# Patient Record
(demographics unavailable — no encounter records)

---

## 2024-10-08 NOTE — PHYSICAL EXAM
[Obese] : obese [Normal] : no peripheral adenopathy appreciated [de-identified] : no acute distress [de-identified] : mild expiratory wheezing and ronchi [de-identified] : no tenderness

## 2024-10-08 NOTE — HISTORY OF PRESENT ILLNESS
[de-identified] :  62 y/o M with HTN, DLD, MIA and COPD was referred by Dr Elias for further evaluation and management of erythrocytosis.  His recent blood work from 10/11/19 showed Hb 20.2 with HCT 63.3. Today's Hb was 19.9 with Hct of 59.7. WBC was 7.88 and plts 162. Upon review of records, his Hb/Hct from Feb 2019 was 16.9/54.4. He was never told about any CBC abnormalities in the past.   He does c/o fatigue and skin flushing. Pt denies having any headaches, CP, SOB, palpitations, pruritus, abdominal pain, fever, chills, weight loss, night sweats or lumps.   He has been taking testosterone injections for the last 4 years. Per pt, his testosterone levels were low in the past. Recent testosterone level from Feb 2019 was 964. He has also been a heavy smoker for 40 years. He quit smoking 2 weeks back. Pt was also diagnosed with MIA in 2011, but has been non compliant with CPAP because of discomfort. He also has h/o COPD and pulmonary nodules on lung screening.     [de-identified] : 6/11/2024 Patient returns after a long hiatus recently diagnosed with metastatic poorly differentiated carcinoma involving liver , lung and bone . He presented with mild left rib pain . MRI of liver was more consistent with cholangiocarcinoma , Final pathology including IHC is still pending . He denies weight loss or significant change in cough or breathing . no headaches . He has COPD , MIA , intolerant to CPAP , he continues to smoke ( less than one pack daily )  Family History strongly positive for pancreatic and lung cancer .   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  7/26/2024 Patient returns for cycle 2 of chemoimmunotherapy . He developed moderately severe nausea , dry heaving for one week after first dose and lost some weight .  He felt much better after second dose likely after adding olanzapine . He feels better , increased energy . 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  His main complaint is diffuse back pain and neck stiffness responding to oxycodone . MRI brain shows a small cortical/meningeal enhancing lesion and a second dural based lesion highly suspicious for metastasis , no edema or mass effect . He denies headaches , difficulty with gait or speech .   8/23/2024 Patient returns after cycle 2 , he tolerated well except for modest nausea , He had moderate headaches for 2 days after spinal tap, CSF cytology was negative . cell count is unavailable ? He denies cough , SOB , abdominal pain or vomiting .   9/20/2024 Patient returns after 3 cycles of chemo-immunotherapy , last treatment was associated with prolonged nausea , headaches for which he was seen in ED , received hydration and magnesium . NO weight loss but continues to complain of fatigue and was unable to pursue his favorite activity ( fishing ) . Headaches have subsided.    10/8/2024 Patient returns fo  Cycle 4 day1 of chemoimmunotherapy . He is having bad days for the most part , nausea , fatigue , decreased intake . Blood work notable for slightly increased TSH . As per family , he is more depressed , has episodes of vomiting not always related to chemotherapy . As per patient , he feels best for the first 2 days following treatment ( steroid effect ? ) repeat MRI brain showed decreased meningeal enhancement , CT chest with reduction is adenopathy , Liver lesions are stable but new peritoneal implants are noted. He denies adbominal pain or increased girth ,.

## 2024-10-08 NOTE — ASSESSMENT
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 PDL1 40% TMB : 10  ON cisplatinum/Gemzar/durvalumab , delayed nausea, improved with olanzapine   MRI brain : suspicious for leptomeningeal / dural mets  Negative CSF cytology  Still with mild neck stiffness and low back pain relieved with oxycodone   S/P 3 cycles of cisplatinum/Gemzar/Durvalumab  Delayed nausea , fatigue , headaches , low mg CA19.9 normalized ?   S/P cycle 4 Day1   Nausea , episodes of vomiting , treatment or disease related ? MRI head : decreased meningeal enhancement' CT chest improved , CT abdomen new peritoneal implants  Mixed response ? Depression  elevated TSH , subclinical hypothyroidism   Plan : start on synthroid 25 micro           check cortisol           start on decadron 4 mg bid and taper as tolerated            prognosis discussed at length , patient will likely forgo any further treatment in the presence of progressive disease especiallyt with CNS involvement , consider to start on antidepressant as well .          re-evaluate next week prior to treatment ( cycle 4 day 15 )

## 2024-10-08 NOTE — PHYSICAL EXAM
[Obese] : obese [Normal] : no peripheral adenopathy appreciated [de-identified] : no acute distress [de-identified] : mild expiratory wheezing and ronchi [de-identified] : no tenderness

## 2024-10-08 NOTE — HISTORY OF PRESENT ILLNESS
[de-identified] :  64 y/o M with HTN, DLD, MIA and COPD was referred by Dr Elias for further evaluation and management of erythrocytosis.  His recent blood work from 10/11/19 showed Hb 20.2 with HCT 63.3. Today's Hb was 19.9 with Hct of 59.7. WBC was 7.88 and plts 162. Upon review of records, his Hb/Hct from Feb 2019 was 16.9/54.4. He was never told about any CBC abnormalities in the past.   He does c/o fatigue and skin flushing. Pt denies having any headaches, CP, SOB, palpitations, pruritus, abdominal pain, fever, chills, weight loss, night sweats or lumps.   He has been taking testosterone injections for the last 4 years. Per pt, his testosterone levels were low in the past. Recent testosterone level from Feb 2019 was 964. He has also been a heavy smoker for 40 years. He quit smoking 2 weeks back. Pt was also diagnosed with MIA in 2011, but has been non compliant with CPAP because of discomfort. He also has h/o COPD and pulmonary nodules on lung screening.     [de-identified] : 6/11/2024 Patient returns after a long hiatus recently diagnosed with metastatic poorly differentiated carcinoma involving liver , lung and bone . He presented with mild left rib pain . MRI of liver was more consistent with cholangiocarcinoma , Final pathology including IHC is still pending . He denies weight loss or significant change in cough or breathing . no headaches . He has COPD , MIA , intolerant to CPAP , he continues to smoke ( less than one pack daily )  Family History strongly positive for pancreatic and lung cancer .   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  7/26/2024 Patient returns for cycle 2 of chemoimmunotherapy . He developed moderately severe nausea , dry heaving for one week after first dose and lost some weight .  He felt much better after second dose likely after adding olanzapine . He feels better , increased energy . 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  His main complaint is diffuse back pain and neck stiffness responding to oxycodone . MRI brain shows a small cortical/meningeal enhancing lesion and a second dural based lesion highly suspicious for metastasis , no edema or mass effect . He denies headaches , difficulty with gait or speech .   8/23/2024 Patient returns after cycle 2 , he tolerated well except for modest nausea , He had moderate headaches for 2 days after spinal tap, CSF cytology was negative . cell count is unavailable ? He denies cough , SOB , abdominal pain or vomiting .   9/20/2024 Patient returns after 3 cycles of chemo-immunotherapy , last treatment was associated with prolonged nausea , headaches for which he was seen in ED , received hydration and magnesium . NO weight loss but continues to complain of fatigue and was unable to pursue his favorite activity ( fishing ) . Headaches have subsided.    10/8/2024 Patient returns fo  Cycle 4 day1 of chemoimmunotherapy . He is having bad days for the most part , nausea , fatigue , decreased intake . Blood work notable for slightly increased TSH . As per family , he is more depressed , has episodes of vomiting not always related to chemotherapy . As per patient , he feels best for the first 2 days following treatment ( steroid effect ? ) repeat MRI brain showed decreased meningeal enhancement , CT chest with reduction is adenopathy , Liver lesions are stable but new peritoneal implants are noted. He denies adbominal pain or increased girth ,.

## 2024-10-08 NOTE — ASSESSMENT
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 PDL1 40% TMB : 10  ON cisplatinum/Gemzar/durvalumab , delayed nausea, improved with olanzapine   MRI brain : suspicious for leptomeningeal / dural mets  Negative CSF cytology  Still with mild neck stiffness and low back pain relieved with oxycodone   S/P 3 cycles of cisplatinum/Gemzar/Durvalumab  Delayed nausea , fatigue , headaches , low mg CA19.9 normalized ?   S/P cycle 4 Day1   Nausea , episodes of vomiting , treatment or disease related ? MRI head : decreased meningeal enhancement' CT chest improved , CT abdomen new peritoneal implants  Mixed response ? Depression  elevated TSH , subclinical hypothyroidism   Plan : start on synthroid 25 micro           check cortisol           start on decadron 4 mg bid and taper as tolerated            prognosis discussed at length , patient will likely forgo any further treatment in the presence of progressive disease especiallyt with CNS involvement , consider to start on antidepressant as well .          re-evaluate next week prior to treatment ( cycle 4 day 15 )

## 2024-10-08 NOTE — BEGINNING OF VISIT
[0] : 2) Feeling down, depressed, or hopeless: Not at all (0) [PHQ-2 Negative] : PHQ-2 Negative [VAU2Axqap] : 0 [Pain Scale: ___] : On a scale of 1-10, today the patient's pain is a(n) [unfilled]. [Current] : Current [0-4] : 0-4 [Reviewed, no changes] : Reviewed, no changes

## 2024-10-08 NOTE — BEGINNING OF VISIT
[0] : 2) Feeling down, depressed, or hopeless: Not at all (0) [PHQ-2 Negative] : PHQ-2 Negative [YBV4Axzbr] : 0 [Pain Scale: ___] : On a scale of 1-10, today the patient's pain is a(n) [unfilled]. [Current] : Current [0-4] : 0-4 [Reviewed, no changes] : Reviewed, no changes

## 2024-10-08 NOTE — REASON FOR VISIT
[Follow-Up Visit] : a follow-up [Spouse] : spouse [Family Member] : family member [FreeTextEntry2] : metastatic adenocarcinoma

## 2024-10-22 NOTE — HISTORY OF PRESENT ILLNESS
[de-identified] :  62 y/o M with HTN, DLD, MIA and COPD was referred by Dr Elias for further evaluation and management of erythrocytosis.  His recent blood work from 10/11/19 showed Hb 20.2 with HCT 63.3. Today's Hb was 19.9 with Hct of 59.7. WBC was 7.88 and plts 162. Upon review of records, his Hb/Hct from Feb 2019 was 16.9/54.4. He was never told about any CBC abnormalities in the past.   He does c/o fatigue and skin flushing. Pt denies having any headaches, CP, SOB, palpitations, pruritus, abdominal pain, fever, chills, weight loss, night sweats or lumps.   He has been taking testosterone injections for the last 4 years. Per pt, his testosterone levels were low in the past. Recent testosterone level from Feb 2019 was 964. He has also been a heavy smoker for 40 years. He quit smoking 2 weeks back. Pt was also diagnosed with MIA in 2011, but has been non compliant with CPAP because of discomfort. He also has h/o COPD and pulmonary nodules on lung screening.     [de-identified] : 6/11/2024 Patient returns after a long hiatus recently diagnosed with metastatic poorly differentiated carcinoma involving liver , lung and bone . He presented with mild left rib pain . MRI of liver was more consistent with cholangiocarcinoma , Final pathology including IHC is still pending . He denies weight loss or significant change in cough or breathing . no headaches . He has COPD , MIA , intolerant to CPAP , he continues to smoke ( less than one pack daily )  Family History strongly positive for pancreatic and lung cancer .   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  7/26/2024 Patient returns for cycle 2 of chemoimmunotherapy . He developed moderately severe nausea , dry heaving for one week after first dose and lost some weight .  He felt much better after second dose likely after adding olanzapine . He feels better , increased energy . 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  His main complaint is diffuse back pain and neck stiffness responding to oxycodone . MRI brain shows a small cortical/meningeal enhancing lesion and a second dural based lesion highly suspicious for metastasis , no edema or mass effect . He denies headaches , difficulty with gait or speech .   8/23/2024 Patient returns after cycle 2 , he tolerated well except for modest nausea , He had moderate headaches for 2 days after spinal tap, CSF cytology was negative . cell count is unavailable ? He denies cough , SOB , abdominal pain or vomiting .   9/20/2024 Patient returns after 3 cycles of chemo-immunotherapy , last treatment was associated with prolonged nausea , headaches for which he was seen in ED , received hydration and magnesium . NO weight loss but continues to complain of fatigue and was unable to pursue his favorite activity ( fishing ) . Headaches have subsided.    10/8/2024 Patient returns fo  Cycle 4 day1 of chemoimmunotherapy . He is having bad days for the most part , nausea , fatigue , decreased intake . Blood work notable for slightly increased TSH . As per family , he is more depressed , has episodes of vomiting not always related to chemotherapy . As per patient , he feels best for the first 2 days following treatment ( steroid effect ? ) repeat MRI brain showed decreased meningeal enhancement , CT chest with reduction is adenopathy , Liver lesions are stable but new peritoneal implants are noted. He denies adbominal pain or increased girth ,.

## 2024-10-22 NOTE — PHYSICAL EXAM
[Obese] : obese [Normal] : no peripheral adenopathy appreciated [de-identified] : no acute distress [de-identified] : mild expiratory wheezing and ronchi [de-identified] : no tenderness

## 2024-10-22 NOTE — ASSESSMENT
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 PDL1 40% TMB : 10  ON cisplatinum/Gemzar/durvalumab , delayed nausea, improved with olanzapine   MRI brain : suspicious for leptomeningeal / dural mets  Negative CSF cytology  Still with mild neck stiffness and low back pain relieved with oxycodone   S/P 3 1/2 cycles of cisplatinum/Gemzar/Durvalumab  Delayed nausea , fatigue , headaches , low mg CA19.9 normalized ?   S/P cycle 4 Day1   elevated TSH , subclinical hypothyroidism , started on synthroid   CT scan with mixed response ?? still with fatigue , vomiting ( due to CNS V/S peritoneal disease ? )  Plan : hold chemo today repeat PET scan  consider second line chemo , NGS shows no targetable alteration .  start on antidepressant , steroids if definite progression on PET  as per spouse , he appears despondent , smokes up to 2 packs daily

## 2024-10-22 NOTE — HISTORY OF PRESENT ILLNESS
[de-identified] :  62 y/o M with HTN, DLD, MAI and COPD was referred by Dr Elias for further evaluation and management of erythrocytosis.  His recent blood work from 10/11/19 showed Hb 20.2 with HCT 63.3. Today's Hb was 19.9 with Hct of 59.7. WBC was 7.88 and plts 162. Upon review of records, his Hb/Hct from Feb 2019 was 16.9/54.4. He was never told about any CBC abnormalities in the past.   He does c/o fatigue and skin flushing. Pt denies having any headaches, CP, SOB, palpitations, pruritus, abdominal pain, fever, chills, weight loss, night sweats or lumps.   He has been taking testosterone injections for the last 4 years. Per pt, his testosterone levels were low in the past. Recent testosterone level from Feb 2019 was 964. He has also been a heavy smoker for 40 years. He quit smoking 2 weeks back. Pt was also diagnosed with MIA in 2011, but has been non compliant with CPAP because of discomfort. He also has h/o COPD and pulmonary nodules on lung screening.     [de-identified] : 6/11/2024 Patient returns after a long hiatus recently diagnosed with metastatic poorly differentiated carcinoma involving liver , lung and bone . He presented with mild left rib pain . MRI of liver was more consistent with cholangiocarcinoma , Final pathology including IHC is still pending . He denies weight loss or significant change in cough or breathing . no headaches . He has COPD , MIA , intolerant to CPAP , he continues to smoke ( less than one pack daily )  Family History strongly positive for pancreatic and lung cancer .   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  7/26/2024 Patient returns for cycle 2 of chemoimmunotherapy . He developed moderately severe nausea , dry heaving for one week after first dose and lost some weight .  He felt much better after second dose likely after adding olanzapine . He feels better , increased energy . 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  His main complaint is diffuse back pain and neck stiffness responding to oxycodone . MRI brain shows a small cortical/meningeal enhancing lesion and a second dural based lesion highly suspicious for metastasis , no edema or mass effect . He denies headaches , difficulty with gait or speech .   8/23/2024 Patient returns after cycle 2 , he tolerated well except for modest nausea , He had moderate headaches for 2 days after spinal tap, CSF cytology was negative . cell count is unavailable ? He denies cough , SOB , abdominal pain or vomiting .   9/20/2024 Patient returns after 3 cycles of chemo-immunotherapy , last treatment was associated with prolonged nausea , headaches for which he was seen in ED , received hydration and magnesium . NO weight loss but continues to complain of fatigue and was unable to pursue his favorite activity ( fishing ) . Headaches have subsided.    10/8/2024 Patient returns fo  Cycle 4 day1 of chemoimmunotherapy . He is having bad days for the most part , nausea , fatigue , decreased intake . Blood work notable for slightly increased TSH . As per family , he is more depressed , has episodes of vomiting not always related to chemotherapy . As per patient , he feels best for the first 2 days following treatment ( steroid effect ? ) repeat MRI brain showed decreased meningeal enhancement , CT chest with reduction is adenopathy , Liver lesions are stable but new peritoneal implants are noted. He denies adbominal pain or increased girth ,.

## 2024-10-22 NOTE — PHYSICAL EXAM
[Obese] : obese [Normal] : no peripheral adenopathy appreciated [de-identified] : no acute distress [de-identified] : mild expiratory wheezing and ronchi [de-identified] : no tenderness

## 2024-10-22 NOTE — ASSESSMENT
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 PDL1 40% TMB : 10  ON cisplatinum/Gemzar/durvalumab , delayed nausea, improved with olanzapine   MRI brain : suspicious for leptomeningeal / dural mets  Negative CSF cytology  Still with mild neck stiffness and low back pain relieved with oxycodone   S/P 3 1/2 cycles of cisplatinum/Gemzar/Durvalumab  Delayed nausea , fatigue , headaches , low mg CA19.9 normalized ?   S/P cycle 4 Day1   elevated TSH , subclinical hypothyroidism , started on synthroid   CT scan with mixed response ?? still with fatigue , vomiting ( due to CNS V/S peritoneal disease ? )  Plan : hold chemo today repeat PET scan  consider second line chemo , NGS shows no targetable alteration .  start on antidepressant , steroids if definite progression on PET  as per spouse , he appears despondent , smokes up to 2 packs daily

## 2024-11-06 NOTE — REASON FOR VISIT
[Follow-Up Visit] : a follow-up [Family Member] : family member [FreeTextEntry2] : cholangiocarcinoma

## 2024-11-06 NOTE — HISTORY OF PRESENT ILLNESS
[de-identified] :  62 y/o M with HTN, DLD, MIA and COPD was referred by Dr Elias for further evaluation and management of erythrocytosis.  His recent blood work from 10/11/19 showed Hb 20.2 with HCT 63.3. Today's Hb was 19.9 with Hct of 59.7. WBC was 7.88 and plts 162. Upon review of records, his Hb/Hct from Feb 2019 was 16.9/54.4. He was never told about any CBC abnormalities in the past.   He does c/o fatigue and skin flushing. Pt denies having any headaches, CP, SOB, palpitations, pruritus, abdominal pain, fever, chills, weight loss, night sweats or lumps.   He has been taking testosterone injections for the last 4 years. Per pt, his testosterone levels were low in the past. Recent testosterone level from Feb 2019 was 964. He has also been a heavy smoker for 40 years. He quit smoking 2 weeks back. Pt was also diagnosed with MIA in 2011, but has been non compliant with CPAP because of discomfort. He also has h/o COPD and pulmonary nodules on lung screening.     [de-identified] : 6/11/2024 Patient returns after a long hiatus recently diagnosed with metastatic poorly differentiated carcinoma involving liver , lung and bone . He presented with mild left rib pain . MRI of liver was more consistent with cholangiocarcinoma , Final pathology including IHC is still pending . He denies weight loss or significant change in cough or breathing . no headaches . He has COPD , MIA , intolerant to CPAP , he continues to smoke ( less than one pack daily )  Family History strongly positive for pancreatic and lung cancer .   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  7/26/2024 Patient returns for cycle 2 of chemoimmunotherapy . He developed moderately severe nausea , dry heaving for one week after first dose and lost some weight .  He felt much better after second dose likely after adding olanzapine . He feels better , increased energy . 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  His main complaint is diffuse back pain and neck stiffness responding to oxycodone . MRI brain shows a small cortical/meningeal enhancing lesion and a second dural based lesion highly suspicious for metastasis , no edema or mass effect . He denies headaches , difficulty with gait or speech .   8/23/2024 Patient returns after cycle 2 , he tolerated well except for modest nausea , He had moderate headaches for 2 days after spinal tap, CSF cytology was negative . cell count is unavailable ? He denies cough , SOB , abdominal pain or vomiting .   9/20/2024 Patient returns after 3 cycles of chemo-immunotherapy , last treatment was associated with prolonged nausea , headaches for which he was seen in ED , received hydration and magnesium . NO weight loss but continues to complain of fatigue and was unable to pursue his favorite activity ( fishing ) . Headaches have subsided.    10/8/2024 Patient returns fo  Cycle 4 day1 of chemoimmunotherapy . He is having bad days for the most part , nausea , fatigue , decreased intake . Blood work notable for slightly increased TSH . As per family , he is more depressed , has episodes of vomiting not always related to chemotherapy . As per patient , he feels best for the first 2 days following treatment ( steroid effect ? ) repeat MRI brain showed decreased meningeal enhancement , CT chest with reduction is adenopathy , Liver lesions are stable but new peritoneal implants are noted. He denies adbominal pain or increased girth ,.   11/1/2024 Patient returns for follow up , PET scan shows progressive disease including multiple new hepatic lesions , He complains of nausea , decreased intake with weight loss of 9 lbs , In addition to chronic low back pain he reports mild RUQ pain .

## 2024-11-06 NOTE — ASSESSMENT
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 PDL1 40% TMB : 10  ON cisplatinum/Gemzar/durvalumab , delayed nausea, improved with olanzapine   MRI brain : suspicious for leptomeningeal / dural mets  Negative CSF cytology  Still with mild neck stiffness and low back pain relieved with oxycodone   S/P 3 1/2 cycles of cisplatinum/Gemzar/Durvalumab  Delayed nausea , fatigue , headaches , low mg CA19.9 normalized ?   S/P cycle 4 Day1   elevated TSH , subclinical hypothyroidism , started on synthroid   S/P pulmonary embolism on eliquis   PET scan with progressive disease, weight loss, nausea  Plan : results discussed , recommend second line therapy with xeloda and irinotecan.          Irinotecan 180/m2- 80% dosage reduction --> 330mg IV D1 (every 14 days)         Xeloda 1000/m2 - 2000mg (4tabs of 500mg PO) BID for 7 days (every 14days) - one week on, one week off         Chemotherapy orders written by Dr. Cardenas and placed in pt's chart           patient is aware of palliative goal of treatment and alternatives including palliative care only .

## 2024-11-06 NOTE — PHYSICAL EXAM
[Obese] : obese [Normal] : no peripheral adenopathy appreciated [de-identified] : no acute distress [de-identified] : mild expiratory wheezing and ronchi [de-identified] : no tenderness

## 2024-11-06 NOTE — PHYSICAL EXAM
[Obese] : obese [Normal] : no peripheral adenopathy appreciated [de-identified] : no acute distress [de-identified] : mild expiratory wheezing and ronchi [de-identified] : no tenderness

## 2024-11-06 NOTE — HISTORY OF PRESENT ILLNESS
[de-identified] :  64 y/o M with HTN, DLD, MIA and COPD was referred by Dr Elias for further evaluation and management of erythrocytosis.  His recent blood work from 10/11/19 showed Hb 20.2 with HCT 63.3. Today's Hb was 19.9 with Hct of 59.7. WBC was 7.88 and plts 162. Upon review of records, his Hb/Hct from Feb 2019 was 16.9/54.4. He was never told about any CBC abnormalities in the past.   He does c/o fatigue and skin flushing. Pt denies having any headaches, CP, SOB, palpitations, pruritus, abdominal pain, fever, chills, weight loss, night sweats or lumps.   He has been taking testosterone injections for the last 4 years. Per pt, his testosterone levels were low in the past. Recent testosterone level from Feb 2019 was 964. He has also been a heavy smoker for 40 years. He quit smoking 2 weeks back. Pt was also diagnosed with MIA in 2011, but has been non compliant with CPAP because of discomfort. He also has h/o COPD and pulmonary nodules on lung screening.     [de-identified] : 6/11/2024 Patient returns after a long hiatus recently diagnosed with metastatic poorly differentiated carcinoma involving liver , lung and bone . He presented with mild left rib pain . MRI of liver was more consistent with cholangiocarcinoma , Final pathology including IHC is still pending . He denies weight loss or significant change in cough or breathing . no headaches . He has COPD , MIA , intolerant to CPAP , he continues to smoke ( less than one pack daily )  Family History strongly positive for pancreatic and lung cancer .   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  7/26/2024 Patient returns for cycle 2 of chemoimmunotherapy . He developed moderately severe nausea , dry heaving for one week after first dose and lost some weight .  He felt much better after second dose likely after adding olanzapine . He feels better , increased energy . 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  His main complaint is diffuse back pain and neck stiffness responding to oxycodone . MRI brain shows a small cortical/meningeal enhancing lesion and a second dural based lesion highly suspicious for metastasis , no edema or mass effect . He denies headaches , difficulty with gait or speech .   8/23/2024 Patient returns after cycle 2 , he tolerated well except for modest nausea , He had moderate headaches for 2 days after spinal tap, CSF cytology was negative . cell count is unavailable ? He denies cough , SOB , abdominal pain or vomiting .   9/20/2024 Patient returns after 3 cycles of chemo-immunotherapy , last treatment was associated with prolonged nausea , headaches for which he was seen in ED , received hydration and magnesium . NO weight loss but continues to complain of fatigue and was unable to pursue his favorite activity ( fishing ) . Headaches have subsided.    10/8/2024 Patient returns fo  Cycle 4 day1 of chemoimmunotherapy . He is having bad days for the most part , nausea , fatigue , decreased intake . Blood work notable for slightly increased TSH . As per family , he is more depressed , has episodes of vomiting not always related to chemotherapy . As per patient , he feels best for the first 2 days following treatment ( steroid effect ? ) repeat MRI brain showed decreased meningeal enhancement , CT chest with reduction is adenopathy , Liver lesions are stable but new peritoneal implants are noted. He denies adbominal pain or increased girth ,.   11/1/2024 Patient returns for follow up , PET scan shows progressive disease including multiple new hepatic lesions , He complains of nausea , decreased intake with weight loss of 9 lbs , In addition to chronic low back pain he reports mild RUQ pain .

## 2024-11-06 NOTE — ASSESSMENT
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 PDL1 40% TMB : 10  ON cisplatinum/Gemzar/durvalumab , delayed nausea, improved with olanzapine   MRI brain : suspicious for leptomeningeal / dural mets  Negative CSF cytology  Still with mild neck stiffness and low back pain relieved with oxycodone   S/P 3 1/2 cycles of cisplatinum/Gemzar/Durvalumab  Delayed nausea , fatigue , headaches , low mg CA19.9 normalized ?   S/P cycle 4 Day1   elevated TSH , subclinical hypothyroidism , started on synthroid   S/P pulmonary embolism on eliquis   PET scan with progressive disease, weight loss, nausea  Plan : results discussed , recommend second line therapy with xeloda and irinotecan.          Irinotecan 180/m2- 80% dosage reduction --> 330mg IV D1 (every 14 days)         Xeloda 1000/m2 - 2000mg (4tabs of 500mg PO) BID for 7 days (every 14days) - one week on, one week off         Chemotherapy orders written by Dr. Cardenas and placed in pt's chart           patient is aware of palliative goal of treatment and alternatives including palliative care only .

## 2024-12-02 NOTE — PLAN
[FreeTextEntry1] : Will discuss with oncology  Echocardiogram is needed  If able to get use a diueretic will need further blood work  Attempt to follow up in office in 4-6 weeks .

## 2024-12-02 NOTE — HISTORY OF PRESENT ILLNESS
[Home] : at home, [unfilled] , at the time of the visit. [Medical Office: ([facility])___] : at the medical office located in  [Spouse] : spouse [Verbal consent obtained from patient] : the patient, [unfilled] [FreeTextEntry1] : The patient has cholangiocarcinoma , was on Capecitabine and Dexamethasone as well. He has had lower BP and valsartan HCT was stopped .The patient has had edema . He has had CASTELLANO on minimal exertion as well. The patient's weight is decreased as well. Has persistent increased :LFTRS's / Would consider stopping the statins  . Will need echocardiogram . Will discuss with hematology re medication and possible use of diueretic . This is a little challenging as his BP has been low . His albumin is alos low and there is a component of third spacing as well.  [Time Spent: ___ minutes] : I have spent [unfilled] minutes with the patient on the telephone